# Patient Record
(demographics unavailable — no encounter records)

---

## 2025-04-29 NOTE — HISTORY OF PRESENT ILLNESS
[de-identified] : 2017-  Dr SHRESTHA- [facility], duodenitis and duodenal ulcers [FreeTextEntry1] : 2022- Doesnt remember where- diverticulosis and hemorrhoids  2017 - Dr SHRESTHA - hemorrhoids, o/w normal - random biopsies- neg for MC -  ---

## 2025-04-29 NOTE — HISTORY OF PRESENT ILLNESS
[de-identified] : 2017-  Dr SHRESTHA- [facility], duodenitis and duodenal ulcers [FreeTextEntry1] : 2022- Doesnt remember where- diverticulosis and hemorrhoids  2017 - Dr SHRESTHA - hemorrhoids, o/w normal - random biopsies- neg for MC -  ---

## 2025-04-29 NOTE — ASSESSMENT
[FreeTextEntry1] : - [facility] - Colonoscopy due to rectal bleeding, most likely hemorrhoidal (vs diverticular)

## 2025-04-29 NOTE — PHYSICAL EXAM
[Alert] : alert [Normal Voice/Communication] : normal voice/communication [Healthy Appearing] : healthy appearing [No Acute Distress] : no acute distress [Sclera] : the sclera and conjunctiva were normal [Hearing Threshold Finger Rub Not [place]] : hearing was normal [Normal Appearance] : the appearance of the neck was normal [No Respiratory Distress] : no respiratory distress [No Acc Muscle Use] : no accessory muscle use [Respiration, Rhythm And Depth] : normal respiratory rhythm and effort [Heart Rate And Rhythm] : heart rate was normal and rhythm regular [Abdomen Tenderness] : non-tender [No Masses] : no abdominal mass palpated [Abdomen Soft] : soft [Normal Color / Pigmentation] : normal skin color and pigmentation [No Focal Deficits] : no focal deficits [Oriented To Time, Place, And Person] : oriented to person, place, and time

## 2025-05-14 NOTE — ASSESSMENT
[FreeTextEntry1] : Ms. Askew is a 68-year-old woman who presented to the hospital on April 11 for GI symptoms, and had 2 convulsions.  Subsequent EEG showed at least 3 subclinical seizures from the left temporal region.  She appears to be seizure-free since starting levetiracetam, but notes that memory, taste and smell have not yet fully returned to baseline.  Plan: 1.  Cognitive rehab referral sent to Silvina Soler and Nica Beauchamp 2.  Change levetiracetam  mg twice a day for more stable levels. 3.  Follow-up visit with me in 3 months. 4.  Lorazepam 1 mg as needed prior to airplane flight or when concern for seizure. 5.  We reviewed [place] driving restrictions.  It is my policy to endorse resumption of driving to [facility] after 6 months seizure-free, but only [place] can reduce the driving restriction. 6.  Ms. ASKEW may travel as planned to [place] in June, but I encouraged her not to drink alcohol until she has progressed further in her recovery at 3 or 6 months.  I also encouraged her to be mindful of sleep, keep regular hours.  I have spent 60 minutes or longer reviewing patient data or discussing with the patient  the cause of seizures or seizure-like events and comorbid conditions, assessing the risk of recurrence, educating the patient or family to recognize seizures, and discussing possible treatment options for seizures and comorbid conditions and possible side effects of medications. I also discussed seizure safety, and ways of reducing seizure risk. Greater than 50% of the encounter time was spent on counseling and coordination of care for reviewing records in [employer], discussion with patient regarding plan.

## 2025-05-14 NOTE — HISTORY OF PRESENT ILLNESS
[FreeTextEntry1] : *** 05/14/2025 *** Ms. Askew presented to [facility] on April 15 in the setting of GI bleed and GI distress.  While in the emergency room, she had 2 convulsive seizures.  She was admitted to the epilepsy monitoring unit where EEG revealed 3 subclinical seizures from the left temporal region.  She was started on levetiracetam 500 mg twice a day, and subsequent EEGs showed resolution of seizures.  Since that time, she endorses increased emotionality, "weepiness" that is usually contextually appropriate.  She also notes changes in taste and smell, as well as poor short-term memory.  Her  remarks that she sometimes has difficulties understanding what he says. Review of history prior to hospitalization does not identify any events suspicious for seizures.  She has frequent GI distress, but this typically unfolds over the course of 30 minutes to 2 hours.  There is no history of staring spells, lipsmacking, behavioral lapses or other symptoms suggestive of complex partial seizures. Ms. ASKEW denies increased irritability, or depressed mood. Medications: Levetiracetam 500 mg twice a day. Past medical history significant for ulcerative colitis. Social history is notable for use of marijuana to treat GI symptoms, social alcohol use Family history notable for maternal history of irritable bowel syndrome  *** from neurology consult at [facility] 4/15/2025 *** CC: new onset seizure HPI: The patient is a 68y Female who presented with new onset seizure in setting of severe nausea and vomiting. She does not recall event, but was told by her  that her eyes rolled back into her head and she had LOC and shaking. She apparently had a second seizure event in the ED that lasted a few minutes and was treated with Ativan and had 20 minutes of post ictal confusion. She has never had seizure previously and there is no family history of seizure. Neurology is asked to evaluate  PAST MEDICAL & SURGICAL HISTORY: Ulcerative colitis Hyperlipidemia S/P rekha  MEDICATIONS (STANDING): ciprofloxacin IVPB 400 milliGRAM(s) IV Intermittent every 12 hours influenza Vaccine (HIGH DOSE) 0.5 milliLiter(s) IntraMuscular once levETIRAcetam 500 milliGRAM(s) Oral two times a day levETIRAcetam IVPB 2000 milliGRAM(s) IV Intermittent once metroNIDAZOLE IVPB 500 milliGRAM(s) IV Intermittent every 8 hours pantoprazole Tablet 40 milliGRAM(s) Oral before breakfast rosuvastatin 40 milliGRAM(s) Oral at bedtime sodium chloride 0.9%. 1000 milliLiter(s) (75 mL/Hr) IV Continuous <Continuous>  MEDICATIONS (PRN): acetaminophen Tablet .. 650 milliGRAM(s) Oral every 6 hours PRN Temp greater or equal to 38C (100.4F), Mild Pain (1 - 3) aluminum hydroxide/magnesium hydroxide/simethicone Suspension 30 milliLiter(s) Oral every 4 hours PRN Dyspepsia melatonin 3 milliGRAM(s) Oral at bedtime PRN Insomnia ondansetron Injectable 4 milliGRAM(s) IV Push every 8 hours PRN Nausea and/or Vomiting  Allergies: codeine (Hives; Angioedema) SOCIAL HISTORY: no tob, occ alcohol, occ MJ FAMILY HISTORY: irritable bowel syndrome (Mother) ROS: 14 point ROS negative other than what is present in HPI or below  Detailed Neurologic Exam: Mental status: The patient is awake and alert and has normal attention span. The patient is fully oriented in 3 spheres. The patient is oriented to current events. The patient is able to name objects, follow commands, repeat sentences. Cranial nerves: Pupils equal and react symmetrically to light. There is no visual field deficit to confrontation. Extraocular motion is full with no nystagmus. There is no ptosis. Facial sensation is intact. Facial musculature is symmetric. Palate elevates symmetrically. Tongue is midline. Motor: There is normal bulk and tone. There is no tremor. Strength is 5/5 in the right arm and leg. Strength is 5/5 in the left arm and leg. Sensation: Intact to light touch in 4 extremities Cerebellar: There is no dysmetria on finger to nose testing. Gait : deferred   ----------------------------------------------------------------------------------------------------------- EEG CLASSIFICATION 4/11-4/12/25: Abnormal EEG in the awake, drowsy and asleep states. 1. Electroclincial seizures, focal, left frontotemporal region. 2. Aris-wave discharges, focal, left frontotemporal region. 3. Intermittent polymorphic delta slowing, focal, left temporal region ----------------------------------------------------------------------------------------------------------- IMPRESSION/CLINICAL CORRELATE: This is an abnormal EEG record. There are electroclinical seizures arising from the left frontotemporal region, as described, last occurring 06:39 4/12/25. 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  RADIOLOGY & ADDITIONAL STUDIES (independently reviewed unless otherwise noted): CT Head No Cont (04.11.25 @ 12:42) Impression: Unremarkable noncontrast head CT.  Assessment and Recommendation This is a 68y Female with new onset seizure. She had one seizure at [facility], followed by one in ED and three more were captured on video EEG overnight. At this point there is no clear etiology for her seizures.  Plan: Admit to EMU- [facility] continuous video EEG, anticipate 24-48 hours duration seizure precautions She had three seizures seen on EEG overnight - load keppra IV 2000 mg x 1 - start keppra  mg bid, titrate if needed ativan prn prolonged seizure MRI brain with and without contrast epilepsy protocol No driving until cleared by neurology will follow with you  Tae Brown MD PhD [id number]

## 2025-07-10 NOTE — PHYSICAL EXAM
[Alert] : alert [Normal Voice/Communication] : normal voice/communication PAST SURGICAL HISTORY:  No significant past surgical history [Healthy Appearing] : healthy appearing [No Acute Distress] : no acute distress [Sclera] : the sclera and conjunctiva were normal [Hearing Threshold Finger Rub Not [place]] : hearing was normal [Normal Lips/Gums] : the lips and gums were normal [Oropharynx] : the oropharynx was normal [Normal Appearance] : the appearance of the neck was normal [No Neck Mass] : no neck mass was observed [No Respiratory Distress] : no respiratory distress [No Acc Muscle Use] : no accessory muscle use [Respiration, Rhythm And Depth] : normal respiratory rhythm and effort [Auscultation Breath Sounds / Voice Sounds] : lungs were clear to auscultation bilaterally [Heart Rate And Rhythm] : heart rate was normal and rhythm regular [Normal S1, S2] : normal S1 and S2 [Murmurs] : no murmurs [Bowel Sounds] : normal bowel sounds [Abdomen Tenderness] : non-tender [No Masses] : no abdominal mass palpated [Abdomen Soft] : soft [] : no hepatosplenomegaly [Oriented To Time, Place, And Person] : oriented to person, place, and time